# Patient Record
Sex: FEMALE | Race: BLACK OR AFRICAN AMERICAN | NOT HISPANIC OR LATINO | URBAN - METROPOLITAN AREA
[De-identification: names, ages, dates, MRNs, and addresses within clinical notes are randomized per-mention and may not be internally consistent; named-entity substitution may affect disease eponyms.]

---

## 2021-09-07 PROCEDURE — 87491 CHLMYD TRACH DNA AMP PROBE: CPT | Performed by: FAMILY MEDICINE

## 2021-09-07 PROCEDURE — 87077 CULTURE AEROBIC IDENTIFY: CPT | Performed by: FAMILY MEDICINE

## 2021-09-07 PROCEDURE — 87591 N.GONORRHOEAE DNA AMP PROB: CPT | Performed by: FAMILY MEDICINE

## 2021-09-07 PROCEDURE — 87661 TRICHOMONAS VAGINALIS AMPLIF: CPT | Performed by: FAMILY MEDICINE

## 2021-09-07 PROCEDURE — 87798 DETECT AGENT NOS DNA AMP: CPT | Performed by: FAMILY MEDICINE

## 2021-09-07 PROCEDURE — 87801 DETECT AGNT MULT DNA AMPLI: CPT | Performed by: FAMILY MEDICINE

## 2021-09-07 PROCEDURE — 87529 HSV DNA AMP PROBE: CPT | Performed by: FAMILY MEDICINE

## 2021-09-07 PROCEDURE — 87186 SC STD MICRODIL/AGAR DIL: CPT | Performed by: FAMILY MEDICINE

## 2021-09-07 PROCEDURE — 87086 URINE CULTURE/COLONY COUNT: CPT | Performed by: FAMILY MEDICINE

## 2021-09-09 ENCOUNTER — TELEPHONE (OUTPATIENT)
Dept: URGENT CARE | Facility: CLINIC | Age: 26
End: 2021-09-09

## 2021-09-17 ENCOUNTER — TELEPHONE (OUTPATIENT)
Dept: URGENT CARE | Facility: CLINIC | Age: 26
End: 2021-09-17

## 2021-10-01 PROCEDURE — 87801 DETECT AGNT MULT DNA AMPLI: CPT | Performed by: NURSE PRACTITIONER

## 2021-10-01 PROCEDURE — 87661 TRICHOMONAS VAGINALIS AMPLIF: CPT | Performed by: NURSE PRACTITIONER

## 2021-10-01 PROCEDURE — 87491 CHLMYD TRACH DNA AMP PROBE: CPT | Performed by: NURSE PRACTITIONER

## 2021-10-01 PROCEDURE — 87529 HSV DNA AMP PROBE: CPT | Performed by: NURSE PRACTITIONER

## 2021-10-01 PROCEDURE — 87798 DETECT AGENT NOS DNA AMP: CPT | Performed by: NURSE PRACTITIONER

## 2021-10-01 PROCEDURE — 87086 URINE CULTURE/COLONY COUNT: CPT | Performed by: NURSE PRACTITIONER

## 2021-10-01 PROCEDURE — 87591 N.GONORRHOEAE DNA AMP PROB: CPT | Performed by: NURSE PRACTITIONER

## 2021-10-06 ENCOUNTER — TELEPHONE (OUTPATIENT)
Dept: URGENT CARE | Facility: CLINIC | Age: 26
End: 2021-10-06

## 2022-01-20 ENCOUNTER — TELEPHONE (OUTPATIENT)
Dept: OBSTETRICS AND GYNECOLOGY | Facility: CLINIC | Age: 27
End: 2022-01-20

## 2022-01-20 DIAGNOSIS — Z30.431 IUD CHECK UP: Primary | ICD-10-CM

## 2022-01-24 ENCOUNTER — OFFICE VISIT (OUTPATIENT)
Dept: OBSTETRICS AND GYNECOLOGY | Facility: CLINIC | Age: 27
End: 2022-01-24

## 2022-01-24 VITALS
SYSTOLIC BLOOD PRESSURE: 128 MMHG | WEIGHT: 293 LBS | DIASTOLIC BLOOD PRESSURE: 80 MMHG | RESPIRATION RATE: 16 BRPM | BODY MASS INDEX: 56.85 KG/M2

## 2022-01-24 DIAGNOSIS — Z30.09 ENCOUNTER FOR OTHER GENERAL COUNSELING OR ADVICE ON CONTRACEPTION: ICD-10-CM

## 2022-01-24 DIAGNOSIS — Z30.432 ENCOUNTER FOR IUD REMOVAL: Primary | ICD-10-CM

## 2022-01-24 PROCEDURE — 99213 OFFICE O/P EST LOW 20 MIN: CPT | Performed by: NURSE PRACTITIONER

## 2022-01-24 PROCEDURE — 58301 REMOVE INTRAUTERINE DEVICE: CPT | Performed by: NURSE PRACTITIONER

## 2022-01-24 RX ORDER — LACTIC ACID, L-, CITRIC ACID MONOHYDRATE, AND POTASSIUM BITARTRATE 90; 50; 20 MG/5G; MG/5G; MG/5G
1 GEL VAGINAL ONCE AS NEEDED
Qty: 12 APPLICATOR | Refills: 3 | Status: SHIPPED | OUTPATIENT
Start: 2022-01-24 | End: 2022-04-26

## 2022-01-24 NOTE — PROGRESS NOTES
"Problem Visit     Patient Name: Charlene Gamez  : 1995   MRN: [id number]   Care Team: Patient Care Team:  Brandon Martell MD as PCP - General (Adolescent Medicine)    Chief Complaint:    Chief Complaint   Patient presents with   • Contraception     IUD is in the cervical canal       HPI: Charlene Gamez is a 27 y.o. year old  presenting to be seen for improper IUD placement - new pt.   C/o AUB for the last year and cramping x 2 wks now.   Pelvic u/s done today confirms Mirena in the lower uterine segment   States she hasn't been sexually active since Dec 2021     States she was seen at  for \"side\" pain and had labs done there which were WNL per pt - this was about 1-2 months ago     Hasn't used anything other than Mirena for contraception since    Has not been using a backup method when she has been sexually active   Doesn't want anything that might make her gain weight   Can't remember to take pills daily     Hx HTN - takes medication     Smoker       Subjective      /80   Resp 16   Wt (!) 175 kg (385 lb)   Breastfeeding No   BMI 56.85 kg/m²     BMI reviewed: Body mass index is 56.85 kg/m².      Objective     Physical Exam      Neuro: alert and oriented to person, place and time   General:  alert; cooperative; well developed; well nourished   Skin:  Not performed.   Thyroid: not examined   Lungs:  breathing is unlabored   Heart:  Not performed.   Breasts:  Not performed.   Abdomen: Not performed.   Pelvis: Clinical staff was present for exam  External genitalia:  normal appearance of the external genitalia including Bartholin's and [place]'s glands.  :  urethral meatus normal;  Vaginal:  normal pink mucosa without prolapse or lesions.  Cervix:  normal appearance. IUD string present - 2.5 cms in length;  Uterus:  not palpable.  Adnexa:  non palpable bilaterally.  Rectal:  digital rectal exam not performed; anus visually normal appearing.     IUD Removal     With the patient in the " dorsal lithotomy position, a speculum was placed into vagina.   Mirena strings were grasped with ring forceps. The Mirena IUD was removed and shown to the patient.   The patient tolerated procedure well.     Assessment / Plan      Assessment  Problems Addressed This Visit    ICD-10-CM ICD-9-CM   1. Encounter for IUD removal  Z30.432 V25.12   2. Encounter for other general counseling or advice on contraception  Z30.09 V25.09       Plan    Discussed immediate return to fertility   If she hasn't used backup contraception in the last month, pregnancy may occur with improper IUD placement   If she does not have a period in a month, she will let me know     Samples of Phexxi given today with instruction   Script to pharmacy   Will f/u in office in 3 months for AV with pap smear     Pelvic pain should resolve, if it does not, she will let me know     C/o upper abdominal pain and nausea   Not likely these are r/t Mirena but if they are, they should resolve as well   If they do not, she has an appt with PCP on Friday and she will discuss sx then - pt v/u           Follow Up  Return in about 3 months (around 4/24/2022) for Annual physical.  Patient was given instructions and counseling regarding her condition or for health maintenance advice. Please see specific information pulled into the AVS if appropriate.     Lorene Johnson, ALTON  January 24, 2022  15:29 EST

## 2022-04-26 ENCOUNTER — OFFICE VISIT (OUTPATIENT)
Dept: OBSTETRICS AND GYNECOLOGY | Facility: CLINIC | Age: 27
End: 2022-04-26

## 2022-04-26 VITALS
RESPIRATION RATE: 16 BRPM | DIASTOLIC BLOOD PRESSURE: 80 MMHG | WEIGHT: 293 LBS | BODY MASS INDEX: 59.22 KG/M2 | SYSTOLIC BLOOD PRESSURE: 130 MMHG

## 2022-04-26 DIAGNOSIS — E66.01 MORBID OBESITY WITH BMI OF 50.0-59.9, ADULT: ICD-10-CM

## 2022-04-26 DIAGNOSIS — Z20.2 POSSIBLE EXPOSURE TO STD: ICD-10-CM

## 2022-04-26 DIAGNOSIS — E04.9 THYROID GOITER: ICD-10-CM

## 2022-04-26 DIAGNOSIS — Z01.419 ENCOUNTER FOR WELL WOMAN EXAM WITH ROUTINE GYNECOLOGICAL EXAM: Primary | ICD-10-CM

## 2022-04-26 DIAGNOSIS — B36.0 TINEA VERSICOLOR: ICD-10-CM

## 2022-04-26 DIAGNOSIS — Z30.49 ENCOUNTER FOR SURVEILLANCE OF OTHER CONTRACEPTIVE: ICD-10-CM

## 2022-04-26 DIAGNOSIS — N91.2 AMENORRHEA: ICD-10-CM

## 2022-04-26 LAB
B-HCG UR QL: NEGATIVE
EXPIRATION DATE: NORMAL
INTERNAL NEGATIVE CONTROL: NEGATIVE
INTERNAL POSITIVE CONTROL: POSITIVE
Lab: NORMAL

## 2022-04-26 PROCEDURE — 81025 URINE PREGNANCY TEST: CPT | Performed by: NURSE PRACTITIONER

## 2022-04-26 PROCEDURE — 99395 PREV VISIT EST AGE 18-39: CPT | Performed by: NURSE PRACTITIONER

## 2022-04-26 RX ORDER — FLUCONAZOLE 150 MG/1
TABLET ORAL
Qty: 4 TABLET | Refills: 0 | Status: SHIPPED | OUTPATIENT
Start: 2022-04-26 | End: 2022-07-19

## 2022-04-26 NOTE — PROGRESS NOTES
Annual Visit     Patient Name: Charlene Gamez  : 1995   MRN: [id number]   Care Team: Patient Care Team:  Brandon Martell MD as PCP - General (Adolescent Medicine)    Chief Complaint:    Chief Complaint   Patient presents with   • Annual Exam       HPI: Charlene Gamez is a 27 y.o. year old  presenting to be seen for her gynecologic exam.   Mirena removed 22 d/t malposition in [place] on u/s   Pt c/o AUB and cramping - these resolved after Mirena removed   No contraception since that time   She has resumed sexual activity the last 2 months   Samples of Phexxi given at [facility] but she hasn't used it yet   LMP 3/14/22 - urine PT done today negative   States she doesn't recall having a hx of irregular periods prior to first Mirena   She did become sexually active and no contraception x 2 yrs without conceptions before she started contraceptive method   States she only became pregnant while on BCPs   Reports hirsutism   Also 20 lbs weight gain since her last visit here about 3 months ago - she has noticed increase in appetite   CBC and CMP done in 2021, but no TSH checked     Hx HTN and smoker - has stopped smoking cigarettes, vaping now     Pap smear approx 8 yrs ago - no hx of abnormal results     Graduating from [employer] program this week     Subjective      /80   Resp 16   Wt (!) 182 kg (401 lb)   LMP 2022   Breastfeeding No   BMI 59.22 kg/m²     BMI reviewed: Body mass index is 59.22 kg/m².      Objective     Physical Exam    Neuro: alert and oriented to person, place and time   General:  alert; cooperative; well developed; well nourished   Skin:  No suspicious lesions seen  Tinea versicolor noted on forehead and back and chest    Thyroid: enlarged   Lungs:  breathing is unlabored  clear to auscultation bilaterally   Heart:  regular rate and rhythm, S1, S2 normal, no murmur, click, rub or gallop  normal apical impulse   Breasts:  Examined in supine position  Symmetric without  masses or skin dimpling  Nipples normal without inversion, lesions or discharge  There are no palpable axillary nodes   Abdomen: soft, non-tender; no masses  no umbilical or inguinal hernias are present  no hepato-splenomegaly   Pelvis: Clinical staff was present for exam  External genitalia:  normal appearance of the external genitalia including Bartholin's and [place]'s glands.  :  urethral meatus normal;  Vaginal:  normal pink mucosa without prolapse or lesions.  Cervix:  normal appearance.  Uterus:  not palpable.  Adnexa:  non palpable bilaterally.  Rectal:  digital rectal exam not performed; anus visually normal appearing.     Bimanual exam limited d/t body habitus     Assessment / Plan      Assessment  Problems Addressed This Visit    ICD-10-CM ICD-9-CM   1. Encounter for well woman exam with routine gynecological exam  Z01.419 V72.31   2. Possible exposure to STD  Z20.2 V01.6   3. Amenorrhea  N91.2 626.0   4. Encounter for surveillance of other contraceptive  Z30.49 V25.49   5. Thyroid goiter  E04.9 240.9   6. Tinea versicolor  B36.0 111.0   7. Morbid obesity with BMI of 50.0-59.9, adult (HCC)  E66.01 278.01    Z68.43 V85.43       Plan    Pap smear pending and STI screening pending   Discussed monthly SBEs     Check labs for further evaluation of amenorrhea   Urine PT negative today   Discussed goiter and thyroid dysfx related to menstrual cycles   Check TSH, Free T4 and thyroid u/s   Check FSH, estradiol, testosterone, Hgb A1C, prolactin, and insulin - she will have done in the AM when fasting   Will check B12 d/t weight gain   Will call to discuss lab results and determine final POC re: amenorrhea and birth control   Discussed progestin only methods at length - isn't interested in another Mirena or nexplanon   Concerned about potential weight gain with DMPA   Discussed POPs like Slynd - this would also be helpful for hirsutism - she is concerned about taking a daily pill though   She will consider options  and will decide when calling to discuss lab results   Faithful condom use or Phexxi until then - pt v/u     Discussed tinea versicolor - Diflucan given   If sx do not resolve, she will let me know     AV 1 yr       19 to 39: Counseling/Anticipatory Guidance Discussed: family planning/contraception, healthy weight, sexual behavior and STDs and breast cancer and self breast exams    Follow Up  Return in about 1 year (around 4/26/2023) for Annual physical.  Patient was given instructions and counseling regarding her condition or for health maintenance advice. Please see specific information pulled into the AVS if appropriate.     Lorene Johnson, APRN  April 26, 2022  15:09 EDT

## 2022-04-27 ENCOUNTER — LAB (OUTPATIENT)
Dept: LAB | Facility: HOSPITAL | Age: 27
End: 2022-04-27

## 2022-04-27 DIAGNOSIS — E04.9 THYROID GOITER: ICD-10-CM

## 2022-04-27 DIAGNOSIS — E66.01 MORBID OBESITY WITH BMI OF 50.0-59.9, ADULT: ICD-10-CM

## 2022-04-27 DIAGNOSIS — Z01.419 ENCOUNTER FOR WELL WOMAN EXAM WITH ROUTINE GYNECOLOGICAL EXAM: ICD-10-CM

## 2022-04-27 DIAGNOSIS — N91.2 AMENORRHEA: ICD-10-CM

## 2022-04-28 LAB
HBA1C MFR BLD: 6 % (ref 4.8–5.6)
PROLACTIN SERPL-MCNC: 19.2 NG/ML (ref 4.8–23.3)
T4 FREE SERPL-MCNC: 1.41 NG/DL (ref 0.93–1.7)
TESTOST SERPL-MCNC: 20 NG/DL (ref 13–71)
TSH SERPL DL<=0.005 MIU/L-ACNC: 1.52 UIU/ML (ref 0.27–4.2)
VIT B12 SERPL-MCNC: 492 PG/ML (ref 211–946)

## 2022-04-29 RX ORDER — DROSPIRENONE 4 MG/1
1 TABLET, FILM COATED ORAL DAILY
Qty: 28 TABLET | Refills: 3 | OUTPATIENT
Start: 2022-04-29 | End: 2022-11-07

## 2022-04-29 RX ORDER — MEDROXYPROGESTERONE ACETATE 10 MG/1
10 TABLET ORAL DAILY
Qty: 10 TABLET | Refills: 0 | Status: SHIPPED | OUTPATIENT
Start: 2022-04-29 | End: 2022-05-09

## 2022-07-19 RX ORDER — FLUCONAZOLE 150 MG/1
TABLET ORAL
Qty: 4 TABLET | Refills: 0 | OUTPATIENT
Start: 2022-07-19 | End: 2022-11-07

## 2022-07-19 NOTE — TELEPHONE ENCOUNTER
Brittaney,    Patient of [facility] who was given a prescription for fluconazole to take weekly X 4 weeks on 4/26/22.  This was to treat tinea versicolor, and the patient was asked to call the office if symptoms did not resolve.  No communication from the patient and she has not been seen in the office since that time.    Please advise regarding prescription refill.

## 2022-07-19 NOTE — TELEPHONE ENCOUNTER
I left a msg on her voice mail, requesting that she call us to discuss her problems & the refill request.

## 2022-09-20 DIAGNOSIS — R73.03 PREDIABETES: Primary | ICD-10-CM

## 2022-09-20 NOTE — TELEPHONE ENCOUNTER
Please let her know that I want to do a f/u Hgb A1C to see if this dose is appropriate or if we need to change it.  I have put the order in so she may walk into any [facility] lab to get that done and we will let her know the result and then send in refills of the metformin.     Thanks!

## 2023-01-06 RX ORDER — DROSPIRENONE 4 MG/1
TABLET, FILM COATED ORAL
Qty: 28 TABLET | Refills: 3 | Status: SHIPPED | OUTPATIENT
Start: 2023-01-06

## 2023-02-16 PROCEDURE — 87086 URINE CULTURE/COLONY COUNT: CPT | Performed by: NURSE PRACTITIONER
